# Patient Record
Sex: FEMALE | Race: BLACK OR AFRICAN AMERICAN | NOT HISPANIC OR LATINO | URBAN - METROPOLITAN AREA
[De-identification: names, ages, dates, MRNs, and addresses within clinical notes are randomized per-mention and may not be internally consistent; named-entity substitution may affect disease eponyms.]

---

## 2021-06-15 ENCOUNTER — EMERGENCY (EMERGENCY)
Facility: HOSPITAL | Age: 21
LOS: 1 days | Discharge: HOME | End: 2021-06-15
Admitting: EMERGENCY MEDICINE
Payer: COMMERCIAL

## 2021-06-15 VITALS
HEART RATE: 94 BPM | DIASTOLIC BLOOD PRESSURE: 69 MMHG | RESPIRATION RATE: 20 BRPM | HEIGHT: 60 IN | TEMPERATURE: 98 F | WEIGHT: 192.02 LBS | OXYGEN SATURATION: 96 % | SYSTOLIC BLOOD PRESSURE: 119 MMHG

## 2021-06-15 PROCEDURE — U0003: CPT

## 2021-06-15 PROCEDURE — 71046 X-RAY EXAM CHEST 2 VIEWS: CPT

## 2021-06-15 PROCEDURE — 99284 EMERGENCY DEPT VISIT MOD MDM: CPT | Mod: 25

## 2021-06-15 PROCEDURE — U0005: CPT

## 2021-06-15 PROCEDURE — 99283 EMERGENCY DEPT VISIT LOW MDM: CPT | Mod: 25

## 2021-06-15 PROCEDURE — 71046 X-RAY EXAM CHEST 2 VIEWS: CPT | Mod: 26

## 2021-06-15 NOTE — ED PROVIDER NOTE - ENMT NEGATIVE STATEMENT, MLM
Ears: no ear pain and no hearing problems. Nose: nasal congestion, no nasal drainage. Mouth/Throat: no dysphagia, no hoarseness and no throat pain. Neck: no lumps, no pain, no stiffness and no swollen glands.

## 2021-06-15 NOTE — ED PROVIDER NOTE - NSFOLLOWUPINSTRUCTIONS_ED_ALL_ED_FT
Take cough medication as prescribed. Use albuterol inhaler as directed.  Return to ER if you develop fever, trouble breathing, wheezing.      Acute Cough    WHAT YOU NEED TO KNOW:    An acute cough can last up to 3 weeks. Common causes of an acute cough include a cold, allergies, or a lung infection.     DISCHARGE INSTRUCTIONS:    Return to the emergency department if:   •You have trouble breathing or feel short of breath.      •You cough up blood, or you see blood in your mucus.       •You faint or feel weak or dizzy.       •You have chest pain when you cough or take a deep breath.       •You have new wheezing.       Contact your healthcare provider if:   •You have a fever.       •Your cough lasts longer than 4 weeks.       •Your symptoms do not improve with treatment.       •You have questions or concerns about your condition or care.       Medicines:   •Medicines may be needed to stop the cough, decrease swelling in your airways, or help open your airways. Medicine may also be given to help you cough up mucus. Ask your healthcare provider what over-the-counter medicines you can take. If you have an infection caused by bacteria, you may need antibiotics.       •Take your medicine as directed. Contact your healthcare provider if you think your medicine is not helping or if you have side effects. Tell him or her if you are allergic to any medicine. Keep a list of the medicines, vitamins, and herbs you take. Include the amounts, and when and why you take them. Bring the list or the pill bottles to follow-up visits. Carry your medicine list with you in case of an emergency.      Manage your symptoms:   •Do not smoke and stay away from others who smoke. Nicotine and other chemicals in cigarettes and cigars can cause lung damage and make your cough worse. Ask your healthcare provider for information if you currently smoke and need help to quit. E-cigarettes or smokeless tobacco still contain nicotine. Talk to your healthcare provider before you use these products.       •Drink extra liquids as directed. Liquids will help thin and loosen mucus so you can cough it up. Liquids will also help prevent dehydration. Examples of good liquids to drink include water, fruit juice, and broth. Do not drink liquids that contain caffeine. Caffeine can increase your risk for dehydration. Ask your healthcare provider how much liquid to drink each day.       •Rest as directed. Do not do activities that make your cough worse, such as exercise.       •Use a humidifier or vaporizer. Use a cool mist humidifier or a vaporizer to increase air moisture in your home. This may make it easier for you to breathe and help decrease your cough.       •Eat 2 to 5 mL of honey 2 times each day. Honey can help thin mucus and decrease your cough.       •Use cough drops or lozenges. These can help decrease throat irritation and your cough.       Follow up with your healthcare provider as directed: Write down your questions so you remember to ask them during your visits.

## 2021-06-15 NOTE — ED ADULT TRIAGE NOTE - CHIEF COMPLAINT QUOTE
Pt BIBA for congestion, productive cough, and MIRANDA starting yesterday. Pt endorses hx of asthma. No respiratory distress noted. Pt unsure if she had fevers.

## 2021-06-15 NOTE — ED ADULT NURSE NOTE - OBJECTIVE STATEMENT
Pt c/o huston and cough/congestion since yesterday. Pt reports hx of asthma, did not try using inhaler. Pt with nonproductive cough, resting comfortably in chair, breathing even and unlabored, sinus congestion, pt aaox3 in nad.

## 2021-06-15 NOTE — ED PROVIDER NOTE - OBJECTIVE STATEMENT
20 year old undomiciled female with PMHx of asthma presents today with SOB, Chills, Nasal congestion and dizziness that started this morning. Last night she was woken up from sleep because of her breathing. Patient states she had no symptoms before yesterday. She works at a [employer] daily. Patient has not been vaccinated for covid but states she had a negative covid test. She has no known sick contacts. Patient denies symptoms prior to this morning. Patient states she has unknown exposure at the shelter. 20 year old undomiciled female with PMHx of asthma presents today with SOB, Chills, Nasal congestion and dizziness that started this morning. Last night she was woken up from sleep because of her breathing. Patient states she had no symptoms before yesterday. She works at a [employer] daily. Patient has not been vaccinated for covid but states she had a negative covid test. She has no known sick contacts. Patient states she has unknown exposure at the shelter.

## 2021-06-15 NOTE — ED PROVIDER NOTE - CARDIAC, MLM
Normal rate, regular rhythm.  Heart sounds S1, S2. Normal rate, regular rhythm.  Heart sounds S1, S2.  No murmurs, rubs or gallops.

## 2021-06-15 NOTE — ED PROVIDER NOTE - PATIENT PORTAL LINK FT
You can access the FollowMyHealth Patient Portal offered by [facility] by registering at the following website: [email or web address]. By joining [employer]’s FollowMyHealth portal, you will also be able to view your health information using other applications (apps) compatible with our system.

## 2021-06-15 NOTE — ED ADULT NURSE NOTE - CAS EDP DISCH TYPE
Home no transient paralysis/no weakness/no paresthesias no transient paralysis/no weakness/no paresthesias/no generalized seizures/no focal seizures/no syncope/no tremors

## 2021-06-15 NOTE — ED PROVIDER NOTE - CLINICAL SUMMARY MEDICAL DECISION MAKING FREE TEXT BOX
20 year old undomiciled female with PMHx of asthma presents today with chills, cough, nasal/sinus congestion and SOB x this AM.  Patient comfortable, well appearing, in no respiratory distress and normal lung exam.    Send covid PCR, check CXR    Nasopharyngeal PCR has been obtained and patient has been guided on how to obtain the results.    CXR clear- likely URI, will give RX for tessalon    I have discussed the discharge plan with the patient. The patient agrees with the plan, as discussed.  The patient understands Emergency Department diagnosis is a preliminary diagnosis often based on limited information and that the patient must adhere to the follow-up plan as discussed.  The patient understands that if the symptoms worsen or if prescribed medications do not have the desired/planned effect that the patient must/may return to the closest Emergency Department at any time for further evaluation and treatment.

## 2021-06-15 NOTE — ED ADULT TRIAGE NOTE - WEIGHT IN KG
Start PT.  fup after PT complete.  While this is going on will have pt do light duty at work.  Retry naproxen twice daily as needed with food.  
87.1

## 2021-06-15 NOTE — ED PROVIDER NOTE - CONSTITUTIONAL, MLM
normal... Tired appearing, awake, alert, oriented to person, place, time/situation and in mild distress. Well appearing, awake, alert, oriented to person, place, time/situation and in no apparent distress.

## 2021-06-16 LAB — SARS-COV-2 RNA SPEC QL NAA+PROBE: SIGNIFICANT CHANGE UP

## 2021-06-19 DIAGNOSIS — R05 COUGH: ICD-10-CM

## 2021-06-19 DIAGNOSIS — Z20.822 CONTACT WITH AND (SUSPECTED) EXPOSURE TO COVID-19: ICD-10-CM

## 2021-06-19 DIAGNOSIS — Z91.018 ALLERGY TO OTHER FOODS: ICD-10-CM

## 2021-06-19 DIAGNOSIS — B34.9 VIRAL INFECTION, UNSPECIFIED: ICD-10-CM

## 2021-06-19 DIAGNOSIS — J45.909 UNSPECIFIED ASTHMA, UNCOMPLICATED: ICD-10-CM

## 2021-06-19 DIAGNOSIS — Z88.0 ALLERGY STATUS TO PENICILLIN: ICD-10-CM

## 2022-08-08 NOTE — ED PROVIDER NOTE - NS_EDPROVIDERDISPOUSERTYPE_ED_A_ED
Refill approved as requested.  
I have personally evaluated and examined the patient. The Attending was available to me as a supervising provider if needed.